# Patient Record
Sex: FEMALE | Race: BLACK OR AFRICAN AMERICAN | NOT HISPANIC OR LATINO | URBAN - METROPOLITAN AREA
[De-identification: names, ages, dates, MRNs, and addresses within clinical notes are randomized per-mention and may not be internally consistent; named-entity substitution may affect disease eponyms.]

---

## 2018-06-04 ENCOUNTER — EMERGENCY (EMERGENCY)
Facility: HOSPITAL | Age: 38
LOS: 1 days | Discharge: HOME | End: 2018-06-04
Attending: EMERGENCY MEDICINE | Admitting: EMERGENCY MEDICINE
Payer: MEDICAID

## 2018-06-04 VITALS
TEMPERATURE: 99 F | SYSTOLIC BLOOD PRESSURE: 132 MMHG | HEART RATE: 115 BPM | RESPIRATION RATE: 20 BRPM | OXYGEN SATURATION: 96 % | DIASTOLIC BLOOD PRESSURE: 53 MMHG

## 2018-06-04 PROCEDURE — 71046 X-RAY EXAM CHEST 2 VIEWS: CPT | Mod: 26

## 2018-06-04 PROCEDURE — 99284 EMERGENCY DEPT VISIT MOD MDM: CPT

## 2018-06-04 RX ORDER — ALPRAZOLAM 0.25 MG
0.5 TABLET ORAL ONCE
Qty: 0 | Refills: 0 | Status: DISCONTINUED | OUTPATIENT
Start: 2018-06-04 | End: 2018-06-04

## 2018-06-04 RX ORDER — ALPRAZOLAM 0.25 MG
0.25 TABLET ORAL ONCE
Qty: 0 | Refills: 0 | Status: DISCONTINUED | OUTPATIENT
Start: 2018-06-04 | End: 2018-06-04

## 2018-06-04 RX ADMIN — Medication 0.25 MILLIGRAM(S): at 14:54

## 2018-06-04 NOTE — ED PROVIDER NOTE - PROGRESS NOTE DETAILS
Simon att: Pt tolerated xanax 0.25 mg one tab orally but already appears more calm and breathing more comfortably now that her mother is bedside. Patient awaiting EKG and xr. Simon att: Xr neg. EKG nl axis nsr sinus tach 116 neg st abnl. Markedly improved air movement. Dc instructions below.

## 2018-06-04 NOTE — ED PROVIDER NOTE - CARE PLAN
Principal Discharge DX:	Dyspnea Principal Discharge DX:	Dyspnea  Assessment and plan of treatment:	Seen in ER for shortness of breath. EKG normal. X-ray of chest normal. Breathing improved. Please follow-up with a pulmonologist from attached list for testing. If you develop any fast breathing not relieved by albuterol try breathing into a paper bag to prevent worsening lightheadedness or numbness feeling. Return to ER for any new or worsening symptoms.

## 2018-06-04 NOTE — ED PROVIDER NOTE - OBJECTIVE STATEMENT
14:30 Simon att: 37F Rapid Response from floor acute dyspnea. Patient nad, tearful, speaking in full sentences, peak flow 250cc with poor effort. Patient came to [facility] today to see her grandmother who has stage IV lung ca. Upon finding out her grandmother was terminal and transition to DNR/DNI patient felt unwell, paced in hallway. Mother found her reporting midsternal chest tightness and hyperventilating. Patient took several puffs of ventolin with no resolution of tachypnea. Per mother patient was never formally tested for asthma and per patient she has never been hospitalized for asthma. PMH "asthma" PSH x MED ventolin

## 2018-06-04 NOTE — ED ADULT NURSE NOTE - OBJECTIVE STATEMENT
Alert and oriented x 4. Pt received  spot 3 due to anxiety.  Pt states : " I think Im having an asthma attack Alert and oriented x 4. Pt received  spot 3 due to anxiety.  Pt states : " I think Im having an asthma attack. " Pt lung are clear and no wheezing noted. MD Jorgensen at bedside. Pt state : " My grandmother is sick and we just signed a DNR on her." Pt seems anxious. Alert and oriented x 4. Pt received  spot 3 due to anxiety.  Pt states : " I think Im having an asthma attack. " Pt lung are clear and no wheezing noted. MD Montes at bedside. Pt states : " My grandmother is sick and we just signed a DNR on her." Pt seems anxious. Pt denies chest pain, shortness of breath, nausea, vomiting or dizziness. No painful urination or diarrhea. Medication given as ordered. Will continue to monitor.

## 2018-06-04 NOTE — ED ADULT TRIAGE NOTE - CHIEF COMPLAINT QUOTE
pt comes to ED as a rapid response.  pt c/o "i'm having an asthma attack".  pt took her inhaler proair and ventolin with no relief

## 2018-06-04 NOTE — ED PROVIDER NOTE - PLAN OF CARE
Seen in ER for shortness of breath. EKG normal. X-ray of chest normal. Breathing improved. Please follow-up with a pulmonologist from attached list for testing. If you develop any fast breathing not relieved by albuterol try breathing into a paper bag to prevent worsening lightheadedness or numbness feeling. Return to ER for any new or worsening symptoms.

## 2018-06-04 NOTE — ED PROVIDER NOTE - MEDICAL DECISION MAKING DETAILS
Acute dyspnea, negative wheeze, neg acute distress, breathing improves with calming PLAN ekg, xr, xanax po trial, if no improvement with xanax consider duoneb

## 2018-10-11 ENCOUNTER — INPATIENT (INPATIENT)
Facility: HOSPITAL | Age: 38
LOS: 2 days | Discharge: HOME | End: 2018-10-14
Attending: HOSPITALIST | Admitting: HOSPITALIST
Payer: MEDICAID

## 2018-10-11 VITALS
OXYGEN SATURATION: 96 % | DIASTOLIC BLOOD PRESSURE: 76 MMHG | TEMPERATURE: 100 F | HEART RATE: 122 BPM | RESPIRATION RATE: 26 BRPM | SYSTOLIC BLOOD PRESSURE: 121 MMHG

## 2018-10-11 LAB
ALBUMIN SERPL ELPH-MCNC: 4 G/DL — SIGNIFICANT CHANGE UP (ref 3.3–5)
ALP SERPL-CCNC: 111 U/L — SIGNIFICANT CHANGE UP (ref 40–120)
ALT FLD-CCNC: 9 U/L — SIGNIFICANT CHANGE UP (ref 4–33)
AST SERPL-CCNC: 17 U/L — SIGNIFICANT CHANGE UP (ref 4–32)
BASE EXCESS BLDV CALC-SCNC: 1 MMOL/L — SIGNIFICANT CHANGE UP
BASOPHILS # BLD AUTO: 0.04 K/UL — SIGNIFICANT CHANGE UP (ref 0–0.2)
BASOPHILS NFR BLD AUTO: 0.3 % — SIGNIFICANT CHANGE UP (ref 0–2)
BILIRUB SERPL-MCNC: 0.3 MG/DL — SIGNIFICANT CHANGE UP (ref 0.2–1.2)
BLOOD GAS VENOUS - CREATININE: 0.82 MG/DL — SIGNIFICANT CHANGE UP (ref 0.5–1.3)
BUN SERPL-MCNC: 8 MG/DL — SIGNIFICANT CHANGE UP (ref 7–23)
CALCIUM SERPL-MCNC: 8.7 MG/DL — SIGNIFICANT CHANGE UP (ref 8.4–10.5)
CHLORIDE BLDV-SCNC: 110 MMOL/L — HIGH (ref 96–108)
CHLORIDE SERPL-SCNC: 104 MMOL/L — SIGNIFICANT CHANGE UP (ref 98–107)
CO2 SERPL-SCNC: 23 MMOL/L — SIGNIFICANT CHANGE UP (ref 22–31)
CREAT SERPL-MCNC: 0.78 MG/DL — SIGNIFICANT CHANGE UP (ref 0.5–1.3)
EOSINOPHIL # BLD AUTO: 0.01 K/UL — SIGNIFICANT CHANGE UP (ref 0–0.5)
EOSINOPHIL NFR BLD AUTO: 0.1 % — SIGNIFICANT CHANGE UP (ref 0–6)
GAS PNL BLDV: 140 MMOL/L — SIGNIFICANT CHANGE UP (ref 136–146)
GLUCOSE BLDV-MCNC: 137 — HIGH (ref 70–99)
GLUCOSE SERPL-MCNC: 140 MG/DL — HIGH (ref 70–99)
HCO3 BLDV-SCNC: 23 MMOL/L — SIGNIFICANT CHANGE UP (ref 20–27)
HCT VFR BLD CALC: 39.7 % — SIGNIFICANT CHANGE UP (ref 34.5–45)
HCT VFR BLDV CALC: 40.1 % — SIGNIFICANT CHANGE UP (ref 34.5–45)
HGB BLD-MCNC: 12.3 G/DL — SIGNIFICANT CHANGE UP (ref 11.5–15.5)
HGB BLDV-MCNC: 13 G/DL — SIGNIFICANT CHANGE UP (ref 11.5–15.5)
IMM GRANULOCYTES # BLD AUTO: 0.08 # — SIGNIFICANT CHANGE UP
IMM GRANULOCYTES NFR BLD AUTO: 0.5 % — SIGNIFICANT CHANGE UP (ref 0–1.5)
LACTATE BLDV-MCNC: 2.9 MMOL/L — HIGH (ref 0.5–2)
LYMPHOCYTES # BLD AUTO: 0.6 K/UL — LOW (ref 1–3.3)
LYMPHOCYTES # BLD AUTO: 4.1 % — LOW (ref 13–44)
MCHC RBC-ENTMCNC: 28.7 PG — SIGNIFICANT CHANGE UP (ref 27–34)
MCHC RBC-ENTMCNC: 31 % — LOW (ref 32–36)
MCV RBC AUTO: 92.8 FL — SIGNIFICANT CHANGE UP (ref 80–100)
MONOCYTES # BLD AUTO: 0.28 K/UL — SIGNIFICANT CHANGE UP (ref 0–0.9)
MONOCYTES NFR BLD AUTO: 1.9 % — LOW (ref 2–14)
NEUTROPHILS # BLD AUTO: 13.69 K/UL — HIGH (ref 1.8–7.4)
NEUTROPHILS NFR BLD AUTO: 93.1 % — HIGH (ref 43–77)
NRBC # FLD: 0 — SIGNIFICANT CHANGE UP
PCO2 BLDV: 54 MMHG — HIGH (ref 41–51)
PH BLDV: 7.31 PH — LOW (ref 7.32–7.43)
PLATELET # BLD AUTO: 396 K/UL — SIGNIFICANT CHANGE UP (ref 150–400)
PMV BLD: 9.8 FL — SIGNIFICANT CHANGE UP (ref 7–13)
PO2 BLDV: 32 MMHG — LOW (ref 35–40)
POTASSIUM BLDV-SCNC: 4.1 MMOL/L — SIGNIFICANT CHANGE UP (ref 3.4–4.5)
POTASSIUM SERPL-MCNC: 3.7 MMOL/L — SIGNIFICANT CHANGE UP (ref 3.5–5.3)
POTASSIUM SERPL-SCNC: 3.7 MMOL/L — SIGNIFICANT CHANGE UP (ref 3.5–5.3)
PROT SERPL-MCNC: 8.1 G/DL — SIGNIFICANT CHANGE UP (ref 6–8.3)
RBC # BLD: 4.28 M/UL — SIGNIFICANT CHANGE UP (ref 3.8–5.2)
RBC # FLD: 12.7 % — SIGNIFICANT CHANGE UP (ref 10.3–14.5)
SAO2 % BLDV: 49.2 % — LOW (ref 60–85)
SODIUM SERPL-SCNC: 141 MMOL/L — SIGNIFICANT CHANGE UP (ref 135–145)
WBC # BLD: 14.7 K/UL — HIGH (ref 3.8–10.5)
WBC # FLD AUTO: 14.7 K/UL — HIGH (ref 3.8–10.5)

## 2018-10-11 RX ORDER — SODIUM CHLORIDE 9 MG/ML
1000 INJECTION INTRAMUSCULAR; INTRAVENOUS; SUBCUTANEOUS ONCE
Qty: 0 | Refills: 0 | Status: COMPLETED | OUTPATIENT
Start: 2018-10-11 | End: 2018-10-11

## 2018-10-11 RX ORDER — MAGNESIUM SULFATE 500 MG/ML
2 VIAL (ML) INJECTION ONCE
Qty: 0 | Refills: 0 | Status: COMPLETED | OUTPATIENT
Start: 2018-10-11 | End: 2018-10-11

## 2018-10-11 RX ORDER — IPRATROPIUM/ALBUTEROL SULFATE 18-103MCG
3 AEROSOL WITH ADAPTER (GRAM) INHALATION ONCE
Qty: 0 | Refills: 0 | Status: COMPLETED | OUTPATIENT
Start: 2018-10-11 | End: 2018-10-11

## 2018-10-11 RX ADMIN — Medication 50 GRAM(S): at 23:58

## 2018-10-11 RX ADMIN — Medication 125 MILLIGRAM(S): at 23:11

## 2018-10-11 RX ADMIN — SODIUM CHLORIDE 1000 MILLILITER(S): 9 INJECTION INTRAMUSCULAR; INTRAVENOUS; SUBCUTANEOUS at 23:28

## 2018-10-11 RX ADMIN — Medication 3 MILLILITER(S): at 23:07

## 2018-10-11 NOTE — ED ADULT NURSE REASSESSMENT NOTE - NS ED NURSE REASSESS COMMENT FT1
After solumedrol and duonebs, pt states chest pain has been relieved and no longer feels SOB. Pt is satting at 94% on room air at this time. Pt is still tachypneic fluctuating 19-22. HR has improved but is still tachycardic.   Breathing still appears deep and Lungs sounds have slight expiratory wheezes, however wheezing is not as severe as initial presentation.

## 2018-10-11 NOTE — ED PROVIDER NOTE - ATTENDING CONTRIBUTION TO CARE
37F p/w asthma exacerbation - developed chest tightness, wheeze, SOB starting today.  En route via EMS pt got nebs and steroids.  EMS noted pt to be hypoxic to 93%.  No fever.  Recent asthma exacerbation a couple of months ago with ED visit and course of steroids.  Here pt still wheezing and SOB but It is improved.  Continue nebs, rx add'l mag, fluids, check labs and CXR.  Reassess.  VS:  tachycardia, tachypnea    GEN -mild resp distress wheezing; A+O x3 (+)obese  HEAD - NC/AT     ENT - PEERL, EOMI, mucous membranes  moist , no discharge      NECK: Neck supple, non-tender without lymphadenopathy, no masses, no JVD  PULM -bilat end-exp wheeze  symmetric breath sounds  COR -  normal heart sounds    ABD - , ND, NT, soft,  BACK - no CVA tenderness, nontender spine     EXTREMS - no edema, no deformity, warm and well perfused    SKIN - no rash or bruising      NEUROLOGIC - alert, CN 2-12 intact, sensation nl, motor no focal deficit.

## 2018-10-11 NOTE — ED PROVIDER NOTE - OBJECTIVE STATEMENT
38yo F w/ pmhx of Asthma, recently treated for PNA at OSH, with resolution of symptoms, now BIBEMS for sudden onset asthma exacerbation. Pt reports wheezing, cough, sore throat, (+) sick contacts at home. Denies any fever, chills, recent travel. As per EMS, pt was at 93% on room air. Pt with IVL 18g on left AC was given x2 combivent tx, 1g magneisum sulfate and 12mg of dexamethasone en route. Denies any other symptoms.

## 2018-10-11 NOTE — ED ADULT NURSE NOTE - NSIMPLEMENTINTERV_GEN_ALL_ED
Implemented All Fall Risk Interventions:  [place] to call system. Call bell, personal items and telephone within reach. Instruct patient to call for assistance. Room bathroom lighting operational. Non-slip footwear when patient is off stretcher. Physically safe environment: no spills, clutter or unnecessary equipment. Stretcher in lowest position, wheels locked, appropriate side rails in place. Provide visual cue, wrist band, yellow gown, etc. Monitor gait and stability. Monitor for mental status changes and reorient to person, place, and time. Review medications for side effects contributing to fall risk. Reinforce activity limits and safety measures with patient and family.

## 2018-10-11 NOTE — ED ADULT TRIAGE NOTE - CHIEF COMPLAINT QUOTE
Pt c/o SOB, asthma exacerbation since 2 hours ago. Pt wheezing. As per EMS, pt 93% on room air. Pt with IVL 18g on left AC given x2 combivent tx, 1g magneisum sulfate, 12mg of dexamethasone by EMS. PMHx asthma.

## 2018-10-11 NOTE — ED ADULT NURSE NOTE - OBJECTIVE STATEMENT
Pt brought to  2 by EMS on 8L non-rebreather. Pt has history of asthma; no prior history of intubation. Pt states around 830 pm she started having difficulty breathing and SOB, dry cough, and dyspnea on exhalation. Pt states EMS gave her 2 duonebs. Pt states on way to the hospital she felt mid sternal chest pressure when coughing. Pt also states she has had recent sick contacts.    Pt is A&Ox4.Pt exhibitis deep labored breathing, with accessory muscle usage, is tachypneic and tachycardic; breath sounds have expiratory wheezes. However, pt is able to speak full clear sentences at this time.    20G IV in L AC placed by EMS.  20G IV in placed in right AC, labs drawn and sent as ordered, pt placed on CM; pt is sinus tachycardia.   Medications given as ordered.  Pt is

## 2018-10-12 DIAGNOSIS — B34.8 OTHER VIRAL INFECTIONS OF UNSPECIFIED SITE: ICD-10-CM

## 2018-10-12 DIAGNOSIS — Z29.9 ENCOUNTER FOR PROPHYLACTIC MEASURES, UNSPECIFIED: ICD-10-CM

## 2018-10-12 DIAGNOSIS — J96.01 ACUTE RESPIRATORY FAILURE WITH HYPOXIA: ICD-10-CM

## 2018-10-12 DIAGNOSIS — A41.9 SEPSIS, UNSPECIFIED ORGANISM: ICD-10-CM

## 2018-10-12 DIAGNOSIS — R09.02 HYPOXEMIA: ICD-10-CM

## 2018-10-12 DIAGNOSIS — J45.909 UNSPECIFIED ASTHMA, UNCOMPLICATED: ICD-10-CM

## 2018-10-12 DIAGNOSIS — J45.901 UNSPECIFIED ASTHMA WITH (ACUTE) EXACERBATION: ICD-10-CM

## 2018-10-12 DIAGNOSIS — E83.39 OTHER DISORDERS OF PHOSPHORUS METABOLISM: ICD-10-CM

## 2018-10-12 LAB
ALBUMIN SERPL ELPH-MCNC: 3.7 G/DL — SIGNIFICANT CHANGE UP (ref 3.3–5)
ALP SERPL-CCNC: 103 U/L — SIGNIFICANT CHANGE UP (ref 40–120)
ALT FLD-CCNC: 11 U/L — SIGNIFICANT CHANGE UP (ref 4–33)
AST SERPL-CCNC: 14 U/L — SIGNIFICANT CHANGE UP (ref 4–32)
B PERT DNA SPEC QL NAA+PROBE: SIGNIFICANT CHANGE UP
BASE EXCESS BLDV CALC-SCNC: -0.1 MMOL/L — SIGNIFICANT CHANGE UP
BILIRUB SERPL-MCNC: 0.3 MG/DL — SIGNIFICANT CHANGE UP (ref 0.2–1.2)
BLOOD GAS VENOUS - CREATININE: 0.62 MG/DL — SIGNIFICANT CHANGE UP (ref 0.5–1.3)
BUN SERPL-MCNC: 8 MG/DL — SIGNIFICANT CHANGE UP (ref 7–23)
C PNEUM DNA SPEC QL NAA+PROBE: NOT DETECTED — SIGNIFICANT CHANGE UP
CALCIUM SERPL-MCNC: 8.4 MG/DL — SIGNIFICANT CHANGE UP (ref 8.4–10.5)
CHLORIDE BLDV-SCNC: 111 MMOL/L — HIGH (ref 96–108)
CHLORIDE SERPL-SCNC: 104 MMOL/L — SIGNIFICANT CHANGE UP (ref 98–107)
CO2 SERPL-SCNC: 19 MMOL/L — LOW (ref 22–31)
CREAT SERPL-MCNC: 0.65 MG/DL — SIGNIFICANT CHANGE UP (ref 0.5–1.3)
FLUAV H1 2009 PAND RNA SPEC QL NAA+PROBE: NOT DETECTED — SIGNIFICANT CHANGE UP
FLUAV H1 RNA SPEC QL NAA+PROBE: NOT DETECTED — SIGNIFICANT CHANGE UP
FLUAV H3 RNA SPEC QL NAA+PROBE: NOT DETECTED — SIGNIFICANT CHANGE UP
FLUAV SUBTYP SPEC NAA+PROBE: SIGNIFICANT CHANGE UP
FLUBV RNA SPEC QL NAA+PROBE: NOT DETECTED — SIGNIFICANT CHANGE UP
GAS PNL BLDV: 136 MMOL/L — SIGNIFICANT CHANGE UP (ref 136–146)
GLUCOSE BLDV-MCNC: 155 — HIGH (ref 70–99)
GLUCOSE SERPL-MCNC: 177 MG/DL — HIGH (ref 70–99)
HADV DNA SPEC QL NAA+PROBE: NOT DETECTED — SIGNIFICANT CHANGE UP
HBA1C BLD-MCNC: 5.5 % — SIGNIFICANT CHANGE UP (ref 4–5.6)
HCG SERPL-ACNC: < 5 MIU/ML — SIGNIFICANT CHANGE UP
HCO3 BLDV-SCNC: 24 MMOL/L — SIGNIFICANT CHANGE UP (ref 20–27)
HCOV 229E RNA SPEC QL NAA+PROBE: NOT DETECTED — SIGNIFICANT CHANGE UP
HCOV HKU1 RNA SPEC QL NAA+PROBE: NOT DETECTED — SIGNIFICANT CHANGE UP
HCOV NL63 RNA SPEC QL NAA+PROBE: NOT DETECTED — SIGNIFICANT CHANGE UP
HCOV OC43 RNA SPEC QL NAA+PROBE: NOT DETECTED — SIGNIFICANT CHANGE UP
HCT VFR BLD CALC: 37.5 % — SIGNIFICANT CHANGE UP (ref 34.5–45)
HCT VFR BLDV CALC: 38.3 % — SIGNIFICANT CHANGE UP (ref 34.5–45)
HGB BLD-MCNC: 11.9 G/DL — SIGNIFICANT CHANGE UP (ref 11.5–15.5)
HGB BLDV-MCNC: 12.4 G/DL — SIGNIFICANT CHANGE UP (ref 11.5–15.5)
HMPV RNA SPEC QL NAA+PROBE: NOT DETECTED — SIGNIFICANT CHANGE UP
HPIV1 RNA SPEC QL NAA+PROBE: NOT DETECTED — SIGNIFICANT CHANGE UP
HPIV2 RNA SPEC QL NAA+PROBE: NOT DETECTED — SIGNIFICANT CHANGE UP
HPIV3 RNA SPEC QL NAA+PROBE: NOT DETECTED — SIGNIFICANT CHANGE UP
HPIV4 RNA SPEC QL NAA+PROBE: NOT DETECTED — SIGNIFICANT CHANGE UP
LACTATE BLDV-MCNC: 1.5 MMOL/L — SIGNIFICANT CHANGE UP (ref 0.5–2)
M PNEUMO DNA SPEC QL NAA+PROBE: NOT DETECTED — SIGNIFICANT CHANGE UP
MAGNESIUM SERPL-MCNC: 2.8 MG/DL — HIGH (ref 1.6–2.6)
MCHC RBC-ENTMCNC: 29.2 PG — SIGNIFICANT CHANGE UP (ref 27–34)
MCHC RBC-ENTMCNC: 31.7 % — LOW (ref 32–36)
MCV RBC AUTO: 92.1 FL — SIGNIFICANT CHANGE UP (ref 80–100)
NRBC # FLD: 0 — SIGNIFICANT CHANGE UP
PCO2 BLDV: 41 MMHG — SIGNIFICANT CHANGE UP (ref 41–51)
PH BLDV: 7.39 PH — SIGNIFICANT CHANGE UP (ref 7.32–7.43)
PHOSPHATE SERPL-MCNC: 2.1 MG/DL — LOW (ref 2.5–4.5)
PLATELET # BLD AUTO: 372 K/UL — SIGNIFICANT CHANGE UP (ref 150–400)
PMV BLD: 10.1 FL — SIGNIFICANT CHANGE UP (ref 7–13)
PO2 BLDV: 42 MMHG — HIGH (ref 35–40)
POTASSIUM BLDV-SCNC: 3.9 MMOL/L — SIGNIFICANT CHANGE UP (ref 3.4–4.5)
POTASSIUM SERPL-MCNC: 4.1 MMOL/L — SIGNIFICANT CHANGE UP (ref 3.5–5.3)
POTASSIUM SERPL-SCNC: 4.1 MMOL/L — SIGNIFICANT CHANGE UP (ref 3.5–5.3)
PROT SERPL-MCNC: 7.6 G/DL — SIGNIFICANT CHANGE UP (ref 6–8.3)
RBC # BLD: 4.07 M/UL — SIGNIFICANT CHANGE UP (ref 3.8–5.2)
RBC # FLD: 12.7 % — SIGNIFICANT CHANGE UP (ref 10.3–14.5)
RSV RNA SPEC QL NAA+PROBE: NOT DETECTED — SIGNIFICANT CHANGE UP
RV+EV RNA SPEC QL NAA+PROBE: POSITIVE — HIGH
SAO2 % BLDV: 74.6 % — SIGNIFICANT CHANGE UP (ref 60–85)
SODIUM SERPL-SCNC: 138 MMOL/L — SIGNIFICANT CHANGE UP (ref 135–145)
TSH SERPL-MCNC: 0.35 UIU/ML — SIGNIFICANT CHANGE UP (ref 0.27–4.2)
WBC # BLD: 16.31 K/UL — HIGH (ref 3.8–10.5)
WBC # FLD AUTO: 16.31 K/UL — HIGH (ref 3.8–10.5)

## 2018-10-12 PROCEDURE — 12345: CPT | Mod: NC

## 2018-10-12 PROCEDURE — 99223 1ST HOSP IP/OBS HIGH 75: CPT | Mod: GC

## 2018-10-12 PROCEDURE — 99221 1ST HOSP IP/OBS SF/LOW 40: CPT | Mod: GC

## 2018-10-12 PROCEDURE — 71045 X-RAY EXAM CHEST 1 VIEW: CPT | Mod: 26

## 2018-10-12 RX ORDER — SODIUM,POTASSIUM PHOSPHATES 278-250MG
1 POWDER IN PACKET (EA) ORAL
Qty: 0 | Refills: 0 | Status: COMPLETED | OUTPATIENT
Start: 2018-10-12 | End: 2018-10-13

## 2018-10-12 RX ORDER — PANTOPRAZOLE SODIUM 20 MG/1
40 TABLET, DELAYED RELEASE ORAL
Qty: 0 | Refills: 0 | Status: DISCONTINUED | OUTPATIENT
Start: 2018-10-12 | End: 2018-10-14

## 2018-10-12 RX ORDER — ALBUTEROL 90 UG/1
2.5 AEROSOL, METERED ORAL ONCE
Qty: 0 | Refills: 0 | Status: COMPLETED | OUTPATIENT
Start: 2018-10-12 | End: 2018-10-12

## 2018-10-12 RX ORDER — IPRATROPIUM/ALBUTEROL SULFATE 18-103MCG
3 AEROSOL WITH ADAPTER (GRAM) INHALATION EVERY 4 HOURS
Qty: 0 | Refills: 0 | Status: DISCONTINUED | OUTPATIENT
Start: 2018-10-12 | End: 2018-10-12

## 2018-10-12 RX ORDER — BUDESONIDE AND FORMOTEROL FUMARATE DIHYDRATE 160; 4.5 UG/1; UG/1
2 AEROSOL RESPIRATORY (INHALATION)
Qty: 0 | Refills: 0 | Status: DISCONTINUED | OUTPATIENT
Start: 2018-10-12 | End: 2018-10-14

## 2018-10-12 RX ORDER — IPRATROPIUM/ALBUTEROL SULFATE 18-103MCG
3 AEROSOL WITH ADAPTER (GRAM) INHALATION EVERY 6 HOURS
Qty: 0 | Refills: 0 | Status: DISCONTINUED | OUTPATIENT
Start: 2018-10-12 | End: 2018-10-14

## 2018-10-12 RX ORDER — SODIUM,POTASSIUM PHOSPHATES 278-250MG
1 POWDER IN PACKET (EA) ORAL
Qty: 0 | Refills: 0 | Status: DISCONTINUED | OUTPATIENT
Start: 2018-10-12 | End: 2018-10-12

## 2018-10-12 RX ADMIN — Medication 40 MILLIGRAM(S): at 07:12

## 2018-10-12 RX ADMIN — SODIUM CHLORIDE 1000 MILLILITER(S): 9 INJECTION INTRAMUSCULAR; INTRAVENOUS; SUBCUTANEOUS at 02:16

## 2018-10-12 RX ADMIN — Medication 3 MILLILITER(S): at 09:37

## 2018-10-12 RX ADMIN — Medication 1 TABLET(S): at 18:36

## 2018-10-12 RX ADMIN — Medication 3 MILLILITER(S): at 04:52

## 2018-10-12 RX ADMIN — ALBUTEROL 2.5 MILLIGRAM(S): 90 AEROSOL, METERED ORAL at 02:30

## 2018-10-12 RX ADMIN — Medication 1 TABLET(S): at 13:49

## 2018-10-12 RX ADMIN — Medication 2 GRAM(S): at 01:16

## 2018-10-12 RX ADMIN — Medication 3 MILLILITER(S): at 13:07

## 2018-10-12 RX ADMIN — BUDESONIDE AND FORMOTEROL FUMARATE DIHYDRATE 2 PUFF(S): 160; 4.5 AEROSOL RESPIRATORY (INHALATION) at 21:46

## 2018-10-12 RX ADMIN — Medication 3 MILLILITER(S): at 16:53

## 2018-10-12 RX ADMIN — Medication 3 MILLILITER(S): at 22:11

## 2018-10-12 NOTE — PROGRESS NOTE ADULT - SUBJECTIVE AND OBJECTIVE BOX
Patient is a 37y old  Female who presents with a chief complaint of SOB (12 Oct 2018 13:07)      SUBJECTIVE / OVERNIGHT EVENTS: Pt was seen and examined at 10:10am, no overnight events, reports that she just had nebulizer treatment, still with cough and some sob, has no pulmonologist and wants to follow here. No other complaints.    MEDICATIONS  (STANDING):  ALBUTerol/ipratropium for Nebulization 3 milliLiter(s) Nebulizer every 4 hours  potassium acid phosphate/sodium acid phosphate tablet (K-PHOS No. 2) 1 Tablet(s) Oral three times a day with meals  predniSONE   Tablet 40 milliGRAM(s) Oral daily    MEDICATIONS  (PRN):      Vital Signs Last 24 Hrs  T(C): 36.8 (12 Oct 2018 09:15), Max: 37.5 (11 Oct 2018 22:17)  T(F): 98.2 (12 Oct 2018 09:15), Max: 99.5 (11 Oct 2018 22:17)  HR: 85 (12 Oct 2018 13:07) (64 - 125)  BP: 123/67 (12 Oct 2018 09:15) (117/70 - 143/95)  BP(mean): --  RR: 18 (12 Oct 2018 09:15) (18 - 26)  SpO2: 97% (12 Oct 2018 13:07) (95% - 99%)  CAPILLARY BLOOD GLUCOSE        I&O's Summary    12 Oct 2018 07:01  -  12 Oct 2018 15:37  --------------------------------------------------------  IN: 0 mL / OUT: 600 mL / NET: -600 mL        PHYSICAL EXAM:  GENERAL: NAD, well-developed  CHEST/LUNG: Decreased bs at bases, no wheeze currently ( just had neb treatment per pt)  HEART: Regular rate and rhythm  ABDOMEN: Soft, Nontender, Nondistended  EXTREMITIES:  no LE edema  PSYCH: calm  NEUROLOGY: AAOx3  SKIN: No rashes or lesions    LABS:                        11.9   16.31 )-----------( 372      ( 12 Oct 2018 06:00 )             37.5     10-12    138  |  104  |  8   ----------------------------<  177<H>  4.1   |  19<L>  |  0.65    Ca    8.4      12 Oct 2018 06:00  Phos  2.1     10-12  Mg     2.8     10-12    TPro  7.6  /  Alb  3.7  /  TBili  0.3  /  DBili  x   /  AST  14  /  ALT  11  /  AlkPhos  103  10-12              RADIOLOGY & ADDITIONAL TESTS:    Imaging Personally Reviewed:    Consultant(s) Notes Reviewed:      Care Discussed with Consultants/Other Providers:

## 2018-10-12 NOTE — PROGRESS NOTE ADULT - PROBLEM SELECTOR PLAN 5
- Improve score 0. No need for pharmacological DVT prophylaxis  - f/u beta-HCG in am - Continue supportive for viral infection

## 2018-10-12 NOTE — H&P ADULT - PROBLEM SELECTOR PLAN 5
- Improve score 0. No need for pharmacological DVT prophylaxis - Improve score 0. No need for pharmacological DVT prophylaxis  - f/u beta-HCG in am

## 2018-10-12 NOTE — H&P ADULT - NSHPPHYSICALEXAM_GEN_ALL_CORE
VS: /80, HR 84, RR 18, SpO2 100%on 2L  GENERAL: NAD, obese  HEAD:  Atraumatic, Normocephalic  EYES: EOMI, conjunctiva and sclera clear  NECK: Supple, No JVD  CHEST/LUNG: Clear to auscultation bilaterally; No wheeze  HEART: Regular rate and rhythm; No murmurs, rubs, or gallops  ABDOMEN: Soft, Nontender, Nondistended; Bowel sounds present  EXTREMITIES:  2+ Peripheral Pulses, No clubbing, cyanosis, or edema  PSYCH: AAOx3  NEUROLOGY: non-focal  SKIN: No rashes or lesions VS: /80, HR 84, RR 18, SpO2 100%on 2L

## 2018-10-12 NOTE — PROGRESS NOTE ADULT - ASSESSMENT
36yo Female with MHx of asthma (Dx'd at the age of 31, never intubated), recently treated for pneumonia at OSH a/w viral sepsis due to Entero/Rhino virus bronchitis complicated by asthma exacerbation with hypoxia;

## 2018-10-12 NOTE — ED ADULT NURSE REASSESSMENT NOTE - NS ED NURSE REASSESS COMMENT FT1
Pt states she suddenly felt SOB and if it was hard to breathe. Pt was satting at 88%; pt put on 4L O2 via NC and now satting at 99%. Pt states she feels less SOB with supplemental O2.     Lung sounds have inspiratory and expiratory wheezes and pt exhibits labored breathing. However, pt is able to speak full clear sentences at this time. Pt does NOT exhibit accessory muscle usage or nasal flaring. MD Rich made aware and is at bedside.

## 2018-10-12 NOTE — H&P ADULT - NSHPLABSRESULTS_GEN_ALL_CORE
12.3   14.70 )-----------( 396      ( 11 Oct 2018 23:00 )             39.7     11 Oct 2018 23:00    141    |  104    |  8      ----------------------------<  140    3.7     |  23     |  0.78     Ca    8.7        11 Oct 2018 23:00    TPro  8.1    /  Alb  4.0    /  TBili  0.3    /  DBili  x      /  AST  17     /  ALT  9      /  AlkPhos  111    11 Oct 2018 23:00    LIVER FUNCTIONS - ( 11 Oct 2018 23:00 )  Alb: 4.0 g/dL / Pro: 8.1 g/dL / ALK PHOS: 111 u/L / ALT: 9 u/L / AST: 17 u/L / GGT: x             CAPILLARY BLOOD GLUCOSE 12.3   14.70 )-----------( 396      ( 11 Oct 2018 23:00 )             39.7     11 Oct 2018 23:00    141    |  104    |  8      ----------------------------<  140    3.7     |  23     |  0.78     Ca    8.7        11 Oct 2018 23:00    TPro  8.1    /  Alb  4.0    /  TBili  0.3    /  DBili  x      /  AST  17     /  ALT  9      /  AlkPhos  111    11 Oct 2018 23:00    LIVER FUNCTIONS - ( 11 Oct 2018 23:00 )  Alb: 4.0 g/dL / Pro: 8.1 g/dL / ALK PHOS: 111 u/L / ALT: 9 u/L / AST: 17 u/L / GGT: x         XR CHEST PORTABLE URGENT 1V        PROCEDURE DATE:  Oct 12 2018         INTERPRETATION:  CLINICAL INFORMATION: Asthma exacerbation, rule out   pneumonia.    TECHNIQUE: AP view of the chest.    COMPARISON: Chest x-ray 6/4/2018.    FINDINGS:     No focal consolidations, pleural effusions, or pneumothorax.  The heart is normal in size.  The visualized osseus structures demonstrate no acute pathology.    IMPRESSION:   Clear lungs.

## 2018-10-12 NOTE — H&P ADULT - PROBLEM SELECTOR PLAN 3
- Meets sepsis criteria on admission. Secondary to entero/rhinovirus  - S/p 1 L IVF. MAP >65  - Continue supportive for viral infection - Hypoxic on admission requiring supp O2, low O2 on VGB   - Continue supplemental oxygen and wean of as tolerated  -VS q4h Hypoxic on admission requiring supp O2, low O2 on VGB; Resolved on repeat VBG;   - Continue supplemental oxygen and wean of as tolerated  - VS q4h

## 2018-10-12 NOTE — H&P ADULT - FAMILY HISTORY
No pertinent family history in first degree relatives Sibling  Still living? Unknown  Family history of gout, Age at diagnosis: Age Unknown

## 2018-10-12 NOTE — H&P ADULT - HISTORY OF PRESENT ILLNESS
37F PMH asthma, recently treated for pneumonia at OSH, with resolution of symptoms, now BIBEMS for sudden onset asthma exacerbation. She was in the usual state of health until last night when she felt chest tightness and short of breath associated with wheezing and cough. As a result, she called 911. . She was diagnosed with asthma at age 31. Last exacerbation was in August. Endorses sick contact  at home. Denies fever/chills, chest pain, palpitations, headache, vision changes, , abdominal pain, hematuria, rash, sick contacts, and recent travel. Per EMS, patient was at 93% on room air.     ED course:  - VS: /76, , afebrile, RR 26m SpO2 96% on supplemental oxygen  - Labs: Lactate 2.9, RVP postive for entero/rhinovirus   - Given 1L IVF, Duoneb, magnesium sulfate 2g, Sulu-Medrol 125mg IVP 38yo Female with MHx of asthma (Dx'd at the age of 31, never intubated), recently treated for pneumonia at OSH, with resolution of symptoms, now BIBEMS for sudden onset asthma exacerbation. She was in the usual state of health until last night when she felt chest tightness and short of breath associated with wheezing and cough. As a result, she called 911. She was diagnosed with asthma at age 31. Last exacerbation was in August. Endorses sick contact  at home. Denies fever/chills, chest pain, palpitations, headache, vision changes, abdominal pain, hematuria, rash, sick contacts, and recent travel. Per EMS, patient was at 93% on room air.     ED course:  - VS: /76, , afebrile, RR 26m SpO2 96% on supplemental oxygen  - Labs: Lactate 2.9, RVP postive for entero/rhinovirus   - Given 1L IVF, Duoneb, magnesium sulfate 2g, Sulu-Medrol 125mg IVP

## 2018-10-12 NOTE — H&P ADULT - NSHPSOCIALHISTORY_GEN_ALL_CORE
Denies smoking  Denies alcohol  Lives with mother Denies smoking  Denies alcohol  Lives with mother  Not employed

## 2018-10-12 NOTE — H&P ADULT - NSHPREVIEWOFSYSTEMS_GEN_ALL_CORE
CONSTITUTIONAL: No fevers or chills  EYES/ENT: No visual changes;  No  throat pain   NECK: No pain   RESPIRATORY: +SOB. No cough, wheezing, hemoptysis;   CARDIOVASCULAR: No chest pain or palpitations  GASTROINTESTINAL: No abdominal or epigastric pain. No nausea, vomiting, or hematemesis; No diarrhea or constipation. No melena or hematochezia.  GENITOURINARY: No dysuria, frequency or hematuria  NEUROLOGICAL: No numbness or weakness  SKIN: No itching, rashes CONSTITUTIONAL: No fevers or chills  EYES/ENT: No visual changes;  No  throat pain   NECK: No pain   RESPIRATORY: (+) SOB, no cough, (+) wheezing, hemoptysis;   CARDIOVASCULAR: No chest pain or palpitations  GASTROINTESTINAL: No abdominal or epigastric pain. No nausea, vomiting, or hematemesis; No diarrhea or constipation. No melena or hematochezia.  GENITOURINARY: No dysuria, frequency or hematuria  NEUROLOGICAL: No numbness or weakness  SKIN: No itching, rashes

## 2018-10-12 NOTE — CONSULT NOTE ADULT - ASSESSMENT
Assessment  ?Moderate persistent asthma now with exacerbation triggered by entero/rhinovirus. Recovering but still with poor air movement and dyspnea. Expect few days to recover.   Cannot clearly describe timeline of symptoms over past few months and was intermittently taking controller medication.  Trigger is likely viral infection with underlying un/dertreated asthma. Is improving as expected one day after beginning treatment. Some of her hypoxemia is due to atelectasis from her obesity as it corrects with deep breathing.     Recs  - Continue prednisone 5 days at least. Still does not have great air movement.   - albuterol as needed  - Instructed patient on use of daily controller medication for her asthma. Should be discharged on ICS/LABA and albuterol PRN.    - Sart symbicort in house to improve adherence and patient technique now/   - Will need follow up in pulmonary clinic/PFTs. She would like to follow here though she lives in [place].     -Jamaal [facility] [phone] to schedule appointment and PFT as new patient for patient the next time she will be in [place].       Chioma Flower MD  Pulmonary/Critical Care Fellow  page: [phone] Assessment  ?Moderate persistent asthma now with exacerbation triggered by entero/rhinovirus. Recovering but still with poor air movement and dyspnea. Expect few days to recover.   Cannot clearly describe timeline of symptoms over past few months and was intermittently taking controller medication.  Trigger is likely viral infection with underlying un/dertreated asthma. Is improving as expected one day after beginning treatment. Some of her hypoxemia is due to atelectasis from her obesity as it corrects with deep breathing.     Recs  - Continue prednisone 5 days at least. Still does not have great air movement.   - albuterol as needed  - Instructed patient on use of daily controller medication for her asthma. Should be discharged on ICS/LABA and albuterol PRN.    - Sart symbicort 160/4.5 BID  in house to improve adherence and patient technique now/   - Will need follow up in pulmonary clinic/PFTs. She would like to follow here though she lives in [place].     -Jamaal guaman [phone] to schedule appointment and PFT as new patient for patient the next time she will be in [place].       Chioma Flower MD  Pulmonary/Critical Care Fellow  page: [phone]

## 2018-10-12 NOTE — PROGRESS NOTE ADULT - PROBLEM SELECTOR PLAN 2
- Triggered by entero/rhinovirus bronchitis;   - s/p Duoneb, magnesium sulfate 2g, Solu-Medrol 125mg IVP  - Continue Duoneb  - Cont Prednisone 40mg short course 5 days  - Pulmonology consult today(the pt wishes to establish care with Pulm)  f/u pulm recs

## 2018-10-12 NOTE — CONSULT NOTE ADULT - ATTENDING COMMENTS
Agree with above. Asthma exacerbation secondary to URI/ entero/rhinovirus. Recommend changing duonebs standing to Q6 hours, provide peak flow meter, continue prednisone, start symbicort. pt feeling better than yesterday.

## 2018-10-12 NOTE — H&P ADULT - ASSESSMENT
37F PMH asthma admitted for viral sepsis complicated by asthma exacerbation and hypoxic respiratory failure 38yo Female with MHx of asthma (Dx'd at the age of 31, never intubated), recently treated for pneumonia at OSH a/w viral sepsis due to Entero/Rhino virus complicated by asthma exacerbation and hypoxia; 36yo Female with MHx of asthma (Dx'd at the age of 31, never intubated), recently treated for pneumonia at OSH a/w viral sepsis due to Entero/Rhino virus bronchitis complicated by asthma exacerbation with hypoxia;

## 2018-10-12 NOTE — PROGRESS NOTE ADULT - PROBLEM SELECTOR PLAN 1
- Meets sepsis criteria on admission. Secondary to entero/rhinovirus infection;   - S/p 1 L IVF. MAP >65  - Continue supportive care for viral infection

## 2018-10-12 NOTE — H&P ADULT - PROBLEM SELECTOR PLAN 1
- Likely exacerbated by entero/rhinovirus  - s/p Duoneb, magnesium sulfate 2g, Solu-Medrol 125mg IVP  - Continue Duoneb  - Started Prednisone 40mg for 3 days  - Pulmonology consult in the AM - Meets sepsis criteria on admission. Secondary to entero/rhinovirus infection;   - S/p 1 L IVF. MAP >65  - Continue supportive care for viral infection

## 2018-10-12 NOTE — H&P ADULT - PROBLEM SELECTOR PLAN 2
- Hypoxic on admission requiring NC  - Likely secondary to entero/rhinovirus  - Continue supplemental oxygen and wean of as tolerated - Triggered by entero/rhinovirus bronchitis;   - s/p Duoneb, magnesium sulfate 2g, Solu-Medrol 125mg IVP  - Continue Duoneb  - Started Prednisone 40mg for 3 days  - Pulmonology consult in the AM (the pt wishes to establish care with Pulm)

## 2018-10-12 NOTE — CONSULT NOTE ADULT - SUBJECTIVE AND OBJECTIVE BOX
CHIEF COMPLAINT:    HPI:    PAST MEDICAL & SURGICAL HISTORY:  Asthma  No significant past surgical history      FAMILY HISTORY:  Family history of gout (Sibling)      SOCIAL HISTORY:  Smoking: [ ] Never Smoked [ ] Former Smoker (__ packs x ___ years) [ ] Current Smoker  (__ packs x ___ years)  Substance Use: [ ] Never Used [ ] Used ____  EtOH Use:  Marital Status: [ ] Single [ ]  [ ]  [ ]   Sexual History:   Occupation:  Recent Travel:  Country of Birth:  Advance Directives:    Allergies    dust (Unknown)  Pollen (Unknown)  Tylenol with Codeine (Unknown)    Intolerances        HOME MEDICATIONS:  Home Medications:  albuterol:  (12 Oct 2018 05:42)  Dulera:  (12 Oct 2018 05:42)      REVIEW OF SYSTEMS:  Constitutional: [ ] negative [ ] fevers [ ] chills [ ] weight loss [ ] weight gain  HEENT: [ ] negative [ ] dry eyes [ ] eye irritation [ ] postnasal drip [ ] nasal congestion  CV: [ ] negative  [ ] chest pain [ ] orthopnea [ ] palpitations [ ] murmur  Resp: [ ] negative [ ] cough [ ] shortness of breath [ ] dyspnea [ ] wheezing [ ] sputum [ ] hemoptysis  GI: [ ] negative [ ] nausea [ ] vomiting [ ] diarrhea [ ] constipation [ ] abd pain [ ] dysphagia   : [ ] negative [ ] dysuria [ ] nocturia [ ] hematuria [ ] increased urinary frequency  Musculoskeletal: [ ] negative [ ] back pain [ ] myalgias [ ] arthralgias [ ] fracture  Skin: [ ] negative [ ] rash [ ] itch  Neurological: [ ] negative [ ] headache [ ] dizziness [ ] syncope [ ] weakness [ ] numbness  Psychiatric: [ ] negative [ ] anxiety [ ] depression  Endocrine: [ ] negative [ ] diabetes [ ] thyroid problem  Hematologic/Lymphatic: [ ] negative [ ] anemia [ ] bleeding problem  Allergic/Immunologic: [ ] negative [ ] itchy eyes [ ] nasal discharge [ ] hives [ ] angioedema  [ ] All other systems negative  [ ] Unable to assess ROS because ________    OBJECTIVE:  ICU Vital Signs Last 24 Hrs  T(C): 36.8 (12 Oct 2018 09:15), Max: 37.5 (11 Oct 2018 22:17)  T(F): 98.2 (12 Oct 2018 09:15), Max: 99.5 (11 Oct 2018 22:17)  HR: 64 (12 Oct 2018 09:15) (64 - 125)  BP: 123/67 (12 Oct 2018 09:15) (117/70 - 143/95)  BP(mean): --  ABP: --  ABP(mean): --  RR: 18 (12 Oct 2018 09:15) (18 - 26)  SpO2: 98% (12 Oct 2018 09:15) (95% - 99%)        CAPILLARY BLOOD GLUCOSE          PHYSICAL EXAM:  General:   HEENT:   Lymph Nodes:  Neck:   Respiratory:   Cardiovascular:   Abdomen:   Extremities:   Skin:   Neurological:  Psychiatry:    HOSPITAL MEDICATIONS:  Standing Meds:  ALBUTerol/ipratropium for Nebulization 3 milliLiter(s) Nebulizer every 4 hours  potassium acid phosphate/sodium acid phosphate tablet (K-PHOS No. 2) 1 Tablet(s) Oral three times a day with meals  predniSONE   Tablet 40 milliGRAM(s) Oral daily      PRN Meds:      LABS:                        11.9   16.31 )-----------( 372      ( 12 Oct 2018 06:00 )             37.5     Hgb Trend: 11.9<--, 12.3<--  10-12    138  |  104  |  8   ----------------------------<  177<H>  4.1   |  19<L>  |  0.65    Ca    8.4      12 Oct 2018 06:00  Phos  2.1     10-12  Mg     2.8     10-12    TPro  7.6  /  Alb  3.7  /  TBili  0.3  /  DBili  x   /  AST  14  /  ALT  11  /  AlkPhos  103  10-12    Creatinine Trend: 0.65<--, 0.78<--        Venous Blood Gas:  10-12 @ 04:50  7.39/41/42/24/74.6  VBG Lactate: 1.5  Venous Blood Gas:  10-11 @ 23:00  7.31/54/32/23/49.2  VBG Lactate: 2.9      MICROBIOLOGY:       RADIOLOGY:  [ ] Reviewed and interpreted by me    PULMONARY FUNCTION TESTS:    EKG: CHIEF COMPLAINT:  shortness of breath    HPI:    PAST MEDICAL & SURGICAL HISTORY:  Asthma  No significant past surgical history      FAMILY HISTORY:  Family history of gout (Sibling)      SOCIAL HISTORY:  Smoking: [ ] Never Smoked [ ] Former Smoker (__ packs x ___ years) [ ] Current Smoker  (__ packs x ___ years)  Substance Use: [ ] Never Used [ ] Used ____  EtOH Use:  Marital Status: [ ] Single [ ]  [ ]  [ ]   Sexual History:   Occupation:  Recent Travel:  Country of Birth:  Advance Directives:    Allergies    dust (Unknown)  Pollen (Unknown)  Tylenol with Codeine (Unknown)    Intolerances        HOME MEDICATIONS:  Home Medications:  albuterol:  (12 Oct 2018 05:42)  Dulera:  (12 Oct 2018 05:42)      REVIEW OF SYSTEMS:  Constitutional: [ ] negative [ ] fevers [ ] chills [ ] weight loss [ ] weight gain  HEENT: [ ] negative [ ] dry eyes [ ] eye irritation [ ] postnasal drip [ ] nasal congestion  CV: [ ] negative  [ ] chest pain [ ] orthopnea [ ] palpitations [ ] murmur  Resp: [ ] negative [ ] cough [ ] shortness of breath [ ] dyspnea [ ] wheezing [ ] sputum [ ] hemoptysis  GI: [ ] negative [ ] nausea [ ] vomiting [ ] diarrhea [ ] constipation [ ] abd pain [ ] dysphagia   : [ ] negative [ ] dysuria [ ] nocturia [ ] hematuria [ ] increased urinary frequency  Musculoskeletal: [ ] negative [ ] back pain [ ] myalgias [ ] arthralgias [ ] fracture  Skin: [ ] negative [ ] rash [ ] itch  Neurological: [ ] negative [ ] headache [ ] dizziness [ ] syncope [ ] weakness [ ] numbness  Psychiatric: [ ] negative [ ] anxiety [ ] depression  Endocrine: [ ] negative [ ] diabetes [ ] thyroid problem  Hematologic/Lymphatic: [ ] negative [ ] anemia [ ] bleeding problem  Allergic/Immunologic: [ ] negative [ ] itchy eyes [ ] nasal discharge [ ] hives [ ] angioedema  [ ] All other systems negative  [ ] Unable to assess ROS because ________    OBJECTIVE:  ICU Vital Signs Last 24 Hrs  T(C): 36.8 (12 Oct 2018 09:15), Max: 37.5 (11 Oct 2018 22:17)  T(F): 98.2 (12 Oct 2018 09:15), Max: 99.5 (11 Oct 2018 22:17)  HR: 64 (12 Oct 2018 09:15) (64 - 125)  BP: 123/67 (12 Oct 2018 09:15) (117/70 - 143/95)  BP(mean): --  ABP: --  ABP(mean): --  RR: 18 (12 Oct 2018 09:15) (18 - 26)  SpO2: 98% (12 Oct 2018 09:15) (95% - 99%)        CAPILLARY BLOOD GLUCOSE          PHYSICAL EXAM:  General:   HEENT:   Lymph Nodes:  Neck:   Respiratory:   Cardiovascular:   Abdomen:   Extremities:   Skin:   Neurological:  Psychiatry:    HOSPITAL MEDICATIONS:  Standing Meds:  ALBUTerol/ipratropium for Nebulization 3 milliLiter(s) Nebulizer every 4 hours  potassium acid phosphate/sodium acid phosphate tablet (K-PHOS No. 2) 1 Tablet(s) Oral three times a day with meals  predniSONE   Tablet 40 milliGRAM(s) Oral daily      PRN Meds:      LABS:                        11.9   16.31 )-----------( 372      ( 12 Oct 2018 06:00 )             37.5     Hgb Trend: 11.9<--, 12.3<--  10-12    138  |  104  |  8   ----------------------------<  177<H>  4.1   |  19<L>  |  0.65    Ca    8.4      12 Oct 2018 06:00  Phos  2.1     10-12  Mg     2.8     10-12    TPro  7.6  /  Alb  3.7  /  TBili  0.3  /  DBili  x   /  AST  14  /  ALT  11  /  AlkPhos  103  10-12    Creatinine Trend: 0.65<--, 0.78<--        Venous Blood Gas:  10-12 @ 04:50  7.39/41/42/24/74.6  VBG Lactate: 1.5  Venous Blood Gas:  10-11 @ 23:00  7.31/54/32/23/49.2  VBG Lactate: 2.9      MICROBIOLOGY:       RADIOLOGY:  [ ] Reviewed and interpreted by me    PULMONARY FUNCTION TESTS:    EKG: CHIEF COMPLAINT:  shortness of breath    HPI:  6 year history of mild intermittent asthma for which she has not need medication. Reports 2 episodes this year of severe shortness of breath and wheezing requiring hospitalization.   Presents now after dyspnea at home led her to fall. She reports sick contacts and coryza before this episode as well as a similar episode in August 2018 which required hospitalization in [place].   Says after that hospitalization she was taking nebulized albuterol as needed for shortness of breath as well as dulera (mometasone/formoterol) as needed, too. Was not taking it daily.     Since asthma diagnosis reports allergies to some animals, dust, but no food allergies. Does not have frequent rhinitis or pruritic eyes, though.     SInce admission says she feels improved, still with some dyspnea walking to bathroom relieved by rest.         PAST MEDICAL & SURGICAL HISTORY:  Asthma  No significant past surgical history      FAMILY HISTORY:  Family history of gout (Sibling)      SOCIAL HISTORY:  Smoking: [X] Never Smoked [ ] Former Smoker (__ packs x ___ years) [ ] Current Smoker  (__ packs x ___ years)  Substance Use: [ ] Never Used [ ] Used ____  EtOH Use:  Marital Status: [X ] Single [ ]  [ ]  [ ]   Sexual History:   Occupation:   Recent Travel:  Country of Birth:  Advance Directives:    Allergies    dust (Unknown)  Pollen (Unknown)  Tylenol with Codeine (Unknown)    Intolerances  \HOME MEDICATIONS:  Home Medications:  albuterol:  (12 Oct 2018 05:42)  Dulera:  (12 Oct 2018 05:42)      REVIEW OF SYSTEMS:  Constitutional: [ ] negative [- ] fevers [- ] chills [ -] weight loss [ ] weight gain  HEENT: [ ] negative [ ] dry eyes [ ] eye irritation [ ] postnasal drip [ ] nasal congestion  CV: [ ] negative  [ ] chest pain [ ] orthopnea [ ] palpitations [ ] murmur  Resp: [ ] negative [ ] cough [ +] shortness of breath [+ ] dyspnea [+ ] wheezing [ -] sputum [ ] hemoptysis  GI: [ ] negative [ ] nausea [ ] vomiting [ ] diarrhea [ ] constipation [ ] abd pain [ ] dysphagia   : [ ] negative [ ] dysuria [ ] nocturia [ ] hematuria [ ] increased urinary frequency  Musculoskeletal: [ ] negative [ ] back pain [ ] myalgias [ ] arthralgias [ ] fracture  Skin: [ ] negative [ ] rash [ ] itch  Neurological: [ ] negative [ ] headache [ ] dizziness [ ] syncope [ ] weakness [ ] numbness  Psychiatric: [ ] negative [ ] anxiety [ ] depression  Endocrine: [ ] negative [ ] diabetes [ ] thyroid problem  Hematologic/Lymphatic: [ ] negative [ ] anemia [ ] bleeding problem  Allergic/Immunologic: [ ] negative [ ] itchy eyes [ ] nasal discharge [ ] hives [ ] angioedema  [X ] All other systems negative  [ ] Unable to assess ROS because ________    OBJECTIVE:  ICU Vital Signs Last 24 Hrs  T(C): 36.8 (12 Oct 2018 09:15), Max: 37.5 (11 Oct 2018 22:17)  T(F): 98.2 (12 Oct 2018 09:15), Max: 99.5 (11 Oct 2018 22:17)  HR: 64 (12 Oct 2018 09:15) (64 - 125)  BP: 123/67 (12 Oct 2018 09:15) (117/70 - 143/95)  RR: 18 (12 Oct 2018 09:15) (18 - 26)  SpO2: 98% (12 Oct 2018 09:15) (95% - 99%)          PHYSICAL EXAM:  General: well appearing obese woman, in no distress  Respiratory: normal effort on 2L NC and room air.   decreased breath sounds at bases. Some scattered expiratory wheezes.   SpO2 91% on room air increases to 96% with 10 deep breaths  No stridor or upper airway sounds  Cardiovascular: rate regular, normal s1 and s2      HOSPITAL MEDICATIONS:  Standing Meds:  ALBUTerol/ipratropium for Nebulization 3 milliLiter(s) Nebulizer every 4 hours  potassium acid phosphate/sodium acid phosphate tablet (K-PHOS No. 2) 1 Tablet(s) Oral three times a day with meals  predniSONE   Tablet 40 milliGRAM(s) Oral daily      PRN Meds:      LABS:                        11.9   16.31 )-----------( 372      ( 12 Oct 2018 06:00 )             37.5     Hgb Trend: 11.9<--, 12.3<--  10-12    138  |  104  |  8   ----------------------------<  177<H>  4.1   |  19<L>  |  0.65    Ca    8.4      12 Oct 2018 06:00  Phos  2.1     10-12  Mg     2.8     10-12    TPro  7.6  /  Alb  3.7  /  TBili  0.3  /  DBili  x   /  AST  14  /  ALT  11  /  AlkPhos  103  10-12    Creatinine Trend: 0.65<--, 0.78<--        Venous Blood Gas:  10-12 @ 04:50  7.39/41/42/24/74.6  VBG Lactate: 1.5  Venous Blood Gas:  10-11 @ 23:00  7.31/54/32/23/49.2  VBG Lactate: 2.9      MICROBIOLOGY:       RADIOLOGY:  [ ] Reviewed and interpreted by me    PULMONARY FUNCTION TESTS:    EKG:

## 2018-10-12 NOTE — PROGRESS NOTE ADULT - PROBLEM SELECTOR PLAN 3
Hypoxic on admission requiring supp O2, low O2 on VGB; Resolved on repeat VBG;   - Continue supplemental oxygen and wean of as tolerated  - VS q4h

## 2018-10-12 NOTE — PATIENT PROFILE ADULT - STATED REASON FOR ADMISSION
"feel faint and I heard my nephew telling me to open my eyes, I felt short of breath and tightness in my chest"

## 2018-10-13 LAB
BUN SERPL-MCNC: 12 MG/DL — SIGNIFICANT CHANGE UP (ref 7–23)
CALCIUM SERPL-MCNC: 9.1 MG/DL — SIGNIFICANT CHANGE UP (ref 8.4–10.5)
CHLORIDE SERPL-SCNC: 104 MMOL/L — SIGNIFICANT CHANGE UP (ref 98–107)
CO2 SERPL-SCNC: 22 MMOL/L — SIGNIFICANT CHANGE UP (ref 22–31)
CREAT SERPL-MCNC: 0.87 MG/DL — SIGNIFICANT CHANGE UP (ref 0.5–1.3)
GLUCOSE SERPL-MCNC: 119 MG/DL — HIGH (ref 70–99)
MAGNESIUM SERPL-MCNC: 2.3 MG/DL — SIGNIFICANT CHANGE UP (ref 1.6–2.6)
PHOSPHATE SERPL-MCNC: 1.6 MG/DL — LOW (ref 2.5–4.5)
POTASSIUM SERPL-MCNC: 4.4 MMOL/L — SIGNIFICANT CHANGE UP (ref 3.5–5.3)
POTASSIUM SERPL-SCNC: 4.4 MMOL/L — SIGNIFICANT CHANGE UP (ref 3.5–5.3)
SODIUM SERPL-SCNC: 140 MMOL/L — SIGNIFICANT CHANGE UP (ref 135–145)

## 2018-10-13 PROCEDURE — 99233 SBSQ HOSP IP/OBS HIGH 50: CPT

## 2018-10-13 RX ORDER — POTASSIUM PHOSPHATE, MONOBASIC POTASSIUM PHOSPHATE, DIBASIC 236; 224 MG/ML; MG/ML
15 INJECTION, SOLUTION INTRAVENOUS ONCE
Qty: 0 | Refills: 0 | Status: COMPLETED | OUTPATIENT
Start: 2018-10-13 | End: 2018-10-13

## 2018-10-13 RX ORDER — SODIUM,POTASSIUM PHOSPHATES 278-250MG
1 POWDER IN PACKET (EA) ORAL
Qty: 0 | Refills: 0 | Status: COMPLETED | OUTPATIENT
Start: 2018-10-13 | End: 2018-10-14

## 2018-10-13 RX ADMIN — Medication 3 MILLILITER(S): at 22:42

## 2018-10-13 RX ADMIN — PANTOPRAZOLE SODIUM 40 MILLIGRAM(S): 20 TABLET, DELAYED RELEASE ORAL at 06:43

## 2018-10-13 RX ADMIN — Medication 3 MILLILITER(S): at 05:05

## 2018-10-13 RX ADMIN — Medication 1 TABLET(S): at 09:47

## 2018-10-13 RX ADMIN — Medication 3 MILLILITER(S): at 15:53

## 2018-10-13 RX ADMIN — Medication 1 TABLET(S): at 18:02

## 2018-10-13 RX ADMIN — Medication 40 MILLIGRAM(S): at 06:43

## 2018-10-13 RX ADMIN — BUDESONIDE AND FORMOTEROL FUMARATE DIHYDRATE 2 PUFF(S): 160; 4.5 AEROSOL RESPIRATORY (INHALATION) at 09:47

## 2018-10-13 RX ADMIN — Medication 3 MILLILITER(S): at 10:52

## 2018-10-13 RX ADMIN — POTASSIUM PHOSPHATE, MONOBASIC POTASSIUM PHOSPHATE, DIBASIC 41.67 MILLIMOLE(S): 236; 224 INJECTION, SOLUTION INTRAVENOUS at 13:21

## 2018-10-13 RX ADMIN — BUDESONIDE AND FORMOTEROL FUMARATE DIHYDRATE 2 PUFF(S): 160; 4.5 AEROSOL RESPIRATORY (INHALATION) at 21:31

## 2018-10-13 RX ADMIN — Medication 1 TABLET(S): at 21:35

## 2018-10-13 NOTE — DISCHARGE NOTE ADULT - PROVIDER TOKENS
FREE:[LAST:[[facility]],PHONE:[(   )    -],FAX:[(   )    -],ADDRESS:[Address: [address] [place] [address]  Phone: [phone]]]

## 2018-10-13 NOTE — DISCHARGE NOTE ADULT - HOSPITAL COURSE
36yo Female with MHx of asthma (Dx'd at the age of 31, never intubated), recently treated for pneumonia at OSH a/w viral sepsis due to Entero/Rhino virus bronchitis complicated by asthma exacerbation with hypoxia;     Problem/Plan - 1:  ·  Problem: Sepsis.  Plan: - Meets sepsis criteria on admission. Secondary to entero/rhinovirus infection;   - S/p 1 L IVF. MAP >65  - Continue supportive care for viral infection.      Problem/Plan - 2:  ·  Problem: Asthma exacerbation.  Plan: - Triggered by entero/rhinovirus bronchitis;   - s/p Duoneb, magnesium sulfate 2g, Solu-Medrol 125mg IVP  - Continue Duoneb  - Cont Prednisone 40mg short course 5 days  - Pulmonology consult appreciated, symbicort has been added  cont current management.      Problem/Plan - 3:  ·  Problem: Hypoxia.  Plan: Hypoxic on admission requiring supp O2, low O2 on VGB; Resolved on repeat VBG;   - Continue supplemental oxygen and wean of as tolerated  - VS q4h.      Problem/Plan - 4:  ·  Problem: Hypophosphatemia.  Plan: replete phos.      Problem/Plan - 5:  ·  Problem: Rhinovirus infection.  Plan: - Continue supportive for viral infection.      Problem/Plan - 6:  Problem: Need for prophylactic measure. Plan: - Improve score 0. No need for pharmacological DVT prophylaxis.      10/14/2018 - medically cleared for discharge, to discharge home with 5 day course of prednisone 36yo Female with MHx of asthma (Dx'd at the age of 31, never intubated), recently treated for pneumonia at OSH a/w viral sepsis due to Entero/Rhino virus bronchitis complicated by asthma exacerbation with hypoxia;    Meets sepsis criteria on admission. Secondary to entero/rhinovirus infection;   - S/p 1 L IVF. MAP >65  - Continue supportive care for viral infection.      Asthma exacerbation.   Triggered by entero/rhinovirus bronchitis;   - s/p Duoneb, magnesium sulfate 2g, Solu-Medrol 125mg IVP  - Continue Duoneb  - Cont Prednisone 40mg short course 5 days  - Pulmonology consult appreciated, symbicort has been added  cont current management.     Hypoxia.    Hypoxic on admission requiring supp O2, low O2 on VGB; Resolved on repeat VBG;   - Continue supplemental oxygen and wean of as tolerated       Rhinovirus infection.  Plan: - Continue supportive for viral infection.       10/14/2018 - medically cleared for discharge, to discharge home with 5 day course of prednisone  Stable for d/c, advised to f/u as outpt

## 2018-10-13 NOTE — PROGRESS NOTE ADULT - SUBJECTIVE AND OBJECTIVE BOX
Patient is a 37y old  Female who presents with a chief complaint of SOB (13 Oct 2018 08:49)      SUBJECTIVE / OVERNIGHT EVENTS: Pt was seen and examined at 11:10am, no overnight events, still with some cough and some sob but better than yesterday. No other complaints.        MEDICATIONS  (STANDING):  ALBUTerol/ipratropium for Nebulization 3 milliLiter(s) Nebulizer every 6 hours  buDESOnide 160 MICROgram(s)/formoterol 4.5 MICROgram(s) Inhaler 2 Puff(s) Inhalation two times a day  pantoprazole    Tablet 40 milliGRAM(s) Oral before breakfast  potassium acid phosphate/sodium acid phosphate tablet (K-PHOS No. 2) 1 Tablet(s) Oral four times a day with meals  predniSONE   Tablet 40 milliGRAM(s) Oral daily    MEDICATIONS  (PRN):      Vital Signs Last 24 Hrs  T(C): 36.8 (13 Oct 2018 06:41), Max: 37.1 (12 Oct 2018 17:29)  T(F): 98.3 (13 Oct 2018 06:41), Max: 98.7 (12 Oct 2018 17:29)  HR: 81 (13 Oct 2018 10:52) (68 - 94)  BP: 125/80 (13 Oct 2018 09:46) (107/63 - 125/80)  BP(mean): --  RR: 20 (13 Oct 2018 09:46) (16 - 20)  SpO2: 97% (13 Oct 2018 10:52) (96% - 100%)  CAPILLARY BLOOD GLUCOSE        I&O's Summary    12 Oct 2018 07:01  -  13 Oct 2018 07:00  --------------------------------------------------------  IN: 0 mL / OUT: 600 mL / NET: -600 mL        PHYSICAL EXAM:  GENERAL: NAD, well-developed  CHEST/LUNG: Decreased bs at bases, no wheezes  HEART: Regular rate and rhythm  ABDOMEN: Soft, Nontender, Nondistended  EXTREMITIES:  no LE edema  PSYCH: calm  NEUROLOGY: AAOx3  SKIN: No rashes or lesions      LABS:                        11.9   16.31 )-----------( 372      ( 12 Oct 2018 06:00 )             37.5     10-13    140  |  104  |  12  ----------------------------<  119<H>  4.4   |  22  |  0.87    Ca    9.1      13 Oct 2018 11:09  Phos  1.6     10-13  Mg     2.3     10-13    TPro  7.6  /  Alb  3.7  /  TBili  0.3  /  DBili  x   /  AST  14  /  ALT  11  /  AlkPhos  103  10-12              RADIOLOGY & ADDITIONAL TESTS:    Imaging Personally Reviewed:    Consultant(s) Notes Reviewed:      Care Discussed with Consultants/Other Providers:

## 2018-10-13 NOTE — DISCHARGE NOTE ADULT - CONDITIONS AT DISCHARGE
AOx4, discharged to home.  Patient education provided.  No s/s of distress noted.  Denies chest pain, SOB, or discomfort.  Respirations even and nonlabored.  Tolerating diet with no signs of GI distress.  Ambulating independently with steady gait.  Vital signs stable.

## 2018-10-13 NOTE — PROGRESS NOTE ADULT - PROBLEM SELECTOR PLAN 2
- Triggered by entero/rhinovirus bronchitis;   - s/p Duoneb, magnesium sulfate 2g, Solu-Medrol 125mg IVP  - Continue Duoneb  - Cont Prednisone 40mg short course 5 days  - Pulmonology consult appreciated, symbicort has been added  cont current management

## 2018-10-13 NOTE — DISCHARGE NOTE ADULT - PLAN OF CARE
resolved Symptoms control Use Symbicort 160/4.5  2 x day, rinse your mouth after each use.   follow up in pulmonary clinic for PFT. Please follow up with your PMD upon discharge, call to schedule appointment Symptoms control - prednisone for a total of 5 day course

## 2018-10-13 NOTE — DISCHARGE NOTE ADULT - MEDICATION SUMMARY - MEDICATIONS TO TAKE
I will START or STAY ON the medications listed below when I get home from the hospital:    predniSONE 20 mg oral tablet  -- 2 tab(s) by mouth once a day x 2 days (start dose tomorrow)  -- Indication: For Asthma    budesonide-formoterol 160 mcg-4.5 mcg/inh inhalation aerosol  -- 2 puff(s) inhaled 2 times a day   -- Indication: For Asthma

## 2018-10-13 NOTE — DISCHARGE NOTE ADULT - MEDICATION SUMMARY - MEDICATIONS TO STOP TAKING
I will STOP taking the medications listed below when I get home from the hospital:    albuterol    Dulera

## 2018-10-13 NOTE — DISCHARGE NOTE ADULT - CARE PLAN
Principal Discharge DX:	Acute respiratory failure with hypoxia  Goal:	resolved  Secondary Diagnosis:	Asthma exacerbation  Goal:	Symptoms control  Assessment and plan of treatment:	Use Symbicort 160/4.5  2 x day, rinse your mouth after each use.   follow up in pulmonary clinic for PFT.  Secondary Diagnosis:	Rhinovirus infection  Goal:	Symptoms control Principal Discharge DX:	Acute respiratory failure with hypoxia  Goal:	resolved  Assessment and plan of treatment:	Please follow up with your PMD upon discharge, call to schedule appointment  Secondary Diagnosis:	Asthma exacerbation  Goal:	Symptoms control - prednisone for a total of 5 day course  Assessment and plan of treatment:	Use Symbicort 160/4.5  2 x day, rinse your mouth after each use.   follow up in pulmonary clinic for PFT.  Secondary Diagnosis:	Rhinovirus infection  Goal:	Symptoms control

## 2018-10-13 NOTE — DISCHARGE NOTE ADULT - CARE PROVIDER_API CALL
[facility] pulmonary clinic,   Address: [address] [place] [address], [place] [address]  Phone: [phone]  Phone: (   )    -  Fax: (   )    -

## 2018-10-13 NOTE — DISCHARGE NOTE ADULT - PATIENT PORTAL LINK FT
You can access the [facility] Portal, offered by [facility], by registering with the following website: [email or web address]

## 2018-10-14 VITALS
RESPIRATION RATE: 18 BRPM | TEMPERATURE: 98 F | HEART RATE: 75 BPM | DIASTOLIC BLOOD PRESSURE: 78 MMHG | SYSTOLIC BLOOD PRESSURE: 117 MMHG | OXYGEN SATURATION: 99 %

## 2018-10-14 LAB
BUN SERPL-MCNC: 12 MG/DL — SIGNIFICANT CHANGE UP (ref 7–23)
CALCIUM SERPL-MCNC: 8.3 MG/DL — LOW (ref 8.4–10.5)
CHLORIDE SERPL-SCNC: 102 MMOL/L — SIGNIFICANT CHANGE UP (ref 98–107)
CO2 SERPL-SCNC: 24 MMOL/L — SIGNIFICANT CHANGE UP (ref 22–31)
CREAT SERPL-MCNC: 0.82 MG/DL — SIGNIFICANT CHANGE UP (ref 0.5–1.3)
GLUCOSE SERPL-MCNC: 79 MG/DL — SIGNIFICANT CHANGE UP (ref 70–99)
MAGNESIUM SERPL-MCNC: 2.1 MG/DL — SIGNIFICANT CHANGE UP (ref 1.6–2.6)
PHOSPHATE SERPL-MCNC: 4 MG/DL — SIGNIFICANT CHANGE UP (ref 2.5–4.5)
POTASSIUM SERPL-MCNC: 3.8 MMOL/L — SIGNIFICANT CHANGE UP (ref 3.5–5.3)
POTASSIUM SERPL-SCNC: 3.8 MMOL/L — SIGNIFICANT CHANGE UP (ref 3.5–5.3)
SODIUM SERPL-SCNC: 138 MMOL/L — SIGNIFICANT CHANGE UP (ref 135–145)

## 2018-10-14 PROCEDURE — 99239 HOSP IP/OBS DSCHRG MGMT >30: CPT

## 2018-10-14 RX ORDER — BUDESONIDE AND FORMOTEROL FUMARATE DIHYDRATE 160; 4.5 UG/1; UG/1
2 AEROSOL RESPIRATORY (INHALATION)
Qty: 1 | Refills: 0 | OUTPATIENT
Start: 2018-10-14

## 2018-10-14 RX ORDER — MOMETASONE FUROATE AND FORMOTEROL FUMARATE DIHYDRATE 200; 5 UG/1; UG/1
0 AEROSOL RESPIRATORY (INHALATION)
Qty: 0 | Refills: 0 | COMMUNITY

## 2018-10-14 RX ORDER — ALBUTEROL 90 UG/1
0 AEROSOL, METERED ORAL
Qty: 0 | Refills: 0 | COMMUNITY

## 2018-10-14 RX ADMIN — Medication 1 TABLET(S): at 09:09

## 2018-10-14 RX ADMIN — Medication 3 MILLILITER(S): at 04:24

## 2018-10-14 RX ADMIN — Medication 1 TABLET(S): at 11:42

## 2018-10-14 RX ADMIN — PANTOPRAZOLE SODIUM 40 MILLIGRAM(S): 20 TABLET, DELAYED RELEASE ORAL at 06:39

## 2018-10-14 RX ADMIN — BUDESONIDE AND FORMOTEROL FUMARATE DIHYDRATE 2 PUFF(S): 160; 4.5 AEROSOL RESPIRATORY (INHALATION) at 09:09

## 2018-10-14 RX ADMIN — Medication 40 MILLIGRAM(S): at 06:38

## 2018-10-14 RX ADMIN — Medication 3 MILLILITER(S): at 10:38

## 2018-10-14 NOTE — PROGRESS NOTE ADULT - PROBLEM SELECTOR PLAN 2
- Triggered by entero/rhinovirus bronchitis;   - s/p Duoneb, magnesium sulfate 2g, Solu-Medrol 125mg IVP  - Continue Duoneb  - Cont Prednisone 40mg short course 5 days  - Pulmonology consult appreciated, symbicort has been added, advised to f/u with pulm as outpt  cont current management

## 2018-10-14 NOTE — PROGRESS NOTE ADULT - ASSESSMENT
38yo Female with MHx of asthma (Dx'd at the age of 31, never intubated), recently treated for pneumonia at OSH a/w viral sepsis due to Entero/Rhino virus bronchitis complicated by asthma exacerbation with hypoxia;

## 2018-10-14 NOTE — PROGRESS NOTE ADULT - PROBLEM SELECTOR PLAN 1
- Meets sepsis criteria on admission. Secondary to entero/rhinovirus infection;   - S/p 1 L IVF. MAP >65  - Continue supportive care for viral infection  Improved, stable for d/c home, d/c planning time spent in coordination of care 37mts ( discussion with pt, NP, preparing d/c summary and plan)

## 2018-10-14 NOTE — PROGRESS NOTE ADULT - SUBJECTIVE AND OBJECTIVE BOX
Patient is a 37y old  Female who presents with a chief complaint of SOB (13 Oct 2018 13:35)      SUBJECTIVE / OVERNIGHT EVENTS: Pt was seen and examined at 10:45am, no overnight events, cough and sob is much better, ready to go home. No other complaints.        MEDICATIONS  (STANDING):  ALBUTerol/ipratropium for Nebulization 3 milliLiter(s) Nebulizer every 6 hours  buDESOnide 160 MICROgram(s)/formoterol 4.5 MICROgram(s) Inhaler 2 Puff(s) Inhalation two times a day  pantoprazole    Tablet 40 milliGRAM(s) Oral before breakfast  predniSONE   Tablet 40 milliGRAM(s) Oral daily    MEDICATIONS  (PRN):      Vital Signs Last 24 Hrs  T(C): 36.6 (14 Oct 2018 06:34), Max: 36.9 (13 Oct 2018 21:30)  T(F): 97.9 (14 Oct 2018 06:34), Max: 98.4 (13 Oct 2018 21:30)  HR: 78 (14 Oct 2018 10:38) (76 - 98)  BP: 102/63 (14 Oct 2018 06:34) (102/63 - 119/71)  BP(mean): --  RR: 17 (14 Oct 2018 06:34) (17 - 18)  SpO2: 96% (14 Oct 2018 10:38) (94% - 98%)  CAPILLARY BLOOD GLUCOSE        I&O's Summary      PHYSICAL EXAM:  GENERAL: NAD, well-developed  CHEST/LUNG: Decreased bs at bases, no wheezes  HEART: Regular rate and rhythm  ABDOMEN: Soft, Nontender, Nondistended  EXTREMITIES:  no LE edema  PSYCH: calm  NEUROLOGY: AAOx3  SKIN: No rashes or lesions      LABS:    10-14    138  |  102  |  12  ----------------------------<  79  3.8   |  24  |  0.82    Ca    8.3<L>      14 Oct 2018 06:02  Phos  4.0     10-14  Mg     2.1     10-14                RADIOLOGY & ADDITIONAL TESTS:    Imaging Personally Reviewed:    Consultant(s) Notes Reviewed:      Care Discussed with Consultants/Other Providers:

## 2019-07-08 NOTE — ED PROVIDER NOTE - NS ED MD EM SELECTION
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 Will allow site to heal prior to beginning Efudex treatment.
Detail Level: Simple
Comment: Advised to use Aquaphor ointment to post op site to speed healing of wound.
[id number] Detailed

## 2020-07-28 NOTE — ED ADULT NURSE NOTE - PRIMARY CARE PROVIDER
Spoke to pt regarding her appt on 8/4. Offered virtual. Pt stated she wants in office appt. c-19 reviewed   
unknown

## 2022-06-01 NOTE — PATIENT PROFILE ADULT - NSPROPOAPRESSUREINJURY_GEN_A_NUR
"Nereida Ngo is a 31year old female and is being seen in our office for   Chief Complaint   Patient presents with   â¢ Abdominal Pain     Patient reports right lower/ mid abdominal pain with right-sided flank pain X 5 days. Denied any urinary sx or fevers. Patient has a Hx of kidney stones. Describes pain as ""sharp and shooting\"". 32 yo femal reports right flank pain,  X 5 days. Denied any urinary sx or fevers. Patient has a Hx of kidney stones. Describes pain as \""sharp and shooting\"". Has had chills, no fever, no n/v/d, pain 4/10,         MEDICATIONS:  Current Outpatient Medications   Medication Sig   â¢ famotidine (PEPCID) 20 MG tablet famotidine 20 mg tablet   â¢ cephalexin (Keflex) 500 MG capsule Take 1 capsule by mouth 2 times daily for 7 days. â¢ MAGNESIUM OXIDE PO Take 400 mg by mouth 2 times daily. No current facility-administered medications for this visit. ALLERGIES:  ALLERGIES:  No Known Allergies    PAST MEDICAL HISTORY:  Past Medical History:   Diagnosis Date   â¢ Gastroesophageal reflux disease    â¢ No known problems        PAST SURGICAL HISTORY:  Past Surgical History:   Procedure Laterality Date   â¢ Breast surgery Bilateral 2017   â¢ Hernia repair  2010       FAMILY HISTORY:  History reviewed. No pertinent family history. SOCIAL HISTORY:  Social History     Tobacco Use   â¢ Smoking status: Never Smoker   â¢ Smokeless tobacco: Never Used   Substance Use Topics   â¢ Alcohol use: Yes     Comment: occasional socially   â¢ Drug use: Never         Patient's medications, allergies, past medical, surgical, and social history  were reviewed and updated as appropriate. REVIEW OF SYSTEMS  Review of Systems   Genitourinary: Positive for flank pain. All other systems reviewed and are negative.       Vitals:    06/01/22 1115   BP: 112/79   BP Location: LUE - Left upper extremity   Patient Position: Sitting   Cuff Size: Regular   Pulse: 92   Resp: 16   Temp: 99.1 Â°F " "(37.3 Â°C)   TempSrc: Oral   SpO2: 100%   Weight: 82.6 kg (182 lb)   Height: 5' 7"" (1.702 m)   PainSc: 5    PainLoc: Abdomen   LMP: 05/31/2022        POINT OF CARE TEST RESULTS    Results for orders placed or performed in visit on 06/01/22   POCT URINE DIP AUTO   Result Value Ref Range    POCT Color Yellow     POCT Appearance Cloudy     POCT Glucose Urine Negative Negative mg/dL    POCT Bilirubin Negative Negative    POCT Ketones Negative Negative mg/dL    POCT Specific Gravity 1.025 1.005 - 1.030    POCT Occult Blood Small (A) Negative, 10 Ck/uL, 25 Ck/uL, 80 Ck/uL, 200 Ck/uL, 15 Eddy/uL, 70  Eddy/uL, 125  Eddy/uL, 500  Eddy/uL, Trace (25 eddy/uL), 1+ (75 eddy/uL), 2+ (500 eddy/uL)    POCT pH 7.0 5 - 7    POCT Protein 30 mg/dL Negative mg/dL    POCT Urobilinogen 1.0 0.0 - 1.0 mg/dL    Urine Nitrite Negative Negative    WBC (Leukocyte) Esterase POC Moderate (A) Negative, 10 Ck/uL, 25 Ck/uL, 80 Ck/uL, 200 Ck/uL, 15 Eddy/uL, 70  Eddy/uL, 125  Eddy/uL, 500  Eddy/uL, Trace (25 eddy/uL), 1+ (75 eddy/uL), 2+ (500 eddy/uL)   POCT URINE PREGNANCY   Result Value Ref Range    URINE PREGNANCY,QUAL Negative Negative    Internal Procedural Controls Acceptable Yes            Objective     Physical Exam  Constitutional:       Appearance: Normal appearance. HENT:      Right Ear: Tympanic membrane normal.      Left Ear: Tympanic membrane normal.      Nose: Nose normal.      Mouth/Throat:      Mouth: Mucous membranes are moist.      Neck: Normal range of motion and neck supple. Eyes:      Extraocular Movements: Extraocular movements intact. Pupils: Pupils are equal, round, and reactive to light. Cardiovascular:      Rate and Rhythm: Normal rate and regular rhythm. Pulses: Normal pulses. Heart sounds: Normal heart sounds. Pulmonary:      Effort: Pulmonary effort is normal.      Breath sounds: Normal breath sounds. Abdominal:      Palpations: Abdomen is soft. Musculoskeletal:         General: Normal range of motion.  " "  Skin:     General: Skin is warm and dry. Capillary Refill: Capillary refill takes less than 2 seconds. Neurological:      General: No focal deficit present. Mental Status: She is alert and oriented to person, place, and time. Assessment / Plan:  Right lower quadrant abdominal pain  (primary encounter diagnosis)  Urinary tract infection with hematuria, site unspecified     MDM    32 yo femal reports right flank pain,  X 5 days. Denied any urinary sx or fevers. Patient has a Hx of kidney stones. Describes pain as ""sharp and shooting\"".  Has had chills, no fever, no n/v/d, pain 4/10,     Examination see above    UA large blood leuk   UC pending    Suggested to push fluid, extra rest, cranberry juice,    May need CT scan to see if kidney stone, we do not have CT today, suggested if worsening pain to KEV Humphrey MD  6/1/2022      " no

## 2023-09-19 NOTE — PROGRESS NOTE ADULT - PROBLEM SELECTOR PLAN 4
From: Pranay Gandhi  To: Zane Shay  Sent: 9/18/2023 6:51 PM CDT  Subject: mail order    New mail order for meds as of 9/1/23 [facility] mail [facility] fax [phone] phone [phone]   - Continue supportive for viral infection replete phos

## 2024-07-06 NOTE — PATIENT PROFILE ADULT - NSPROGENDIFFINTUB_GEN_A_NUR
Patient frequently waking up and yelling. When asked if patient needs anything or if something is wrong patient does not respond. Frequently repositions herself in bed and returns to sleep.   never intubated